# Patient Record
Sex: MALE | Race: ASIAN | NOT HISPANIC OR LATINO | URBAN - METROPOLITAN AREA
[De-identification: names, ages, dates, MRNs, and addresses within clinical notes are randomized per-mention and may not be internally consistent; named-entity substitution may affect disease eponyms.]

---

## 2020-01-05 ENCOUNTER — EMERGENCY (EMERGENCY)
Age: 20
LOS: 1 days | Discharge: HOME | End: 2020-01-05
Attending: EMERGENCY MEDICINE | Admitting: EMERGENCY MEDICINE
Payer: COMMERCIAL

## 2020-01-05 VITALS
DIASTOLIC BLOOD PRESSURE: 81 MMHG | HEART RATE: 90 BPM | TEMPERATURE: 98 F | OXYGEN SATURATION: 100 % | WEIGHT: 198.2 LBS | RESPIRATION RATE: 18 BRPM | SYSTOLIC BLOOD PRESSURE: 135 MMHG

## 2020-01-05 PROCEDURE — 29125 APPL SHORT ARM SPLINT STATIC: CPT | Mod: LT

## 2020-01-05 PROCEDURE — 99284 EMERGENCY DEPT VISIT MOD MDM: CPT | Mod: 25

## 2020-01-05 NOTE — ED STATDOCS - OBJECTIVE STATEMENT
I performed a medical screening examination and determined this patient to be medically stable and will transfer to the [facility] adult ED for further care. heart and lung exam done and both did not reveal concerns for immediate intervention.   neurovascular intact of left wrist, deformity of wrist distal radius with swelling.  in sling.  called to adult side.

## 2020-01-05 NOTE — ED ADULT TRIAGE NOTE - CHIEF COMPLAINT QUOTE
Ptm fell on rt wrist snowboarding. Neurovascularly intact. + deformity noted.  No PMH IUTD NKA Ptm fell on rt wrist snowboarding. Neurovascularly intact. + deformity noted.  No PMH IUTD NKA      2334- Pt arrives from peds ED, c/o left wrist pain/deformity after fall while snowboarding today at 6pm. Arrives with sling in place.

## 2020-01-06 PROCEDURE — 73130 X-RAY EXAM OF HAND: CPT | Mod: 26,LT

## 2020-01-06 PROCEDURE — 73080 X-RAY EXAM OF ELBOW: CPT | Mod: 26,LT

## 2020-01-06 PROCEDURE — 73090 X-RAY EXAM OF FOREARM: CPT | Mod: 26,LT

## 2020-01-06 PROCEDURE — 73110 X-RAY EXAM OF WRIST: CPT | Mod: 26,LT

## 2020-01-06 NOTE — ED PROVIDER NOTE - OBJECTIVE STATEMENT
19M no hx now p/w mild traumatic LUE throbbing pain from FOOSH while snowboarding.  Pain worse w/movement of L hand. No head trauma or LOC.  Pt is able to ROM shoulder & elbow & fingers w/o difficulty but moving his wrist (which he is able to do) hurts. No other complaints.

## 2020-01-06 NOTE — ED PROVIDER NOTE - PHYSICAL EXAMINATION
VS: afebrile, others reassuring   Gen: Well appearing in NAD  Head: NC/AT  HEENT: moist mucous membranes   Neck: trachea midline  Resp:  No distress  Ext: L upper extremity exam: + soft tissue swelling, deformity & ttp over distal forearm/wrist thus limiting ROM & strength testing although there is no erin focal deficit.  5/5 sensation in all districutions. 2+ distal pulses.  Brisk cap refill.  Skin intact.  No snuff box tenderness or pain w/axial load on thumb.   Neuro: A&Ox3, spont. interactive. CN2-12 intact. GCS 15. Strength 5/5 b/l UEs & LEs. No gross sensory deficits. Finger to nose intact b/l. gait normal/stable/unassisted.   Skin:  Warm and dry as visualized  Psych:  appropriate affect and mood

## 2020-01-06 NOTE — ED PROVIDER NOTE - PATIENT PORTAL LINK FT
You can access the FollowMyHealth Patient Portal offered by [facility] by registering at the following website: [email or web address]. By joining [employer]’s FollowMyHealth portal, you will also be able to view your health information using other applications (apps) compatible with our system.

## 2020-01-06 NOTE — ED PROVIDER NOTE - CARE PROVIDER_API CALL
Margaret Mcelroy)  Orthopedics  [address] [place] [address], [address]  [place] [address]  Phone: [phone]  Follow Up Time: 4-6 Days

## 2020-01-06 NOTE — ED PROVIDER NOTE - PROGRESS NOTE DETAILS
Zvi Dubin, MD note: I appreciated an extraarticular distal rad rx.  Sugar tong splint applied, pt given f/u, return to the Emergency Department instructions & is ready for dc.

## 2020-01-06 NOTE — ED PROVIDER NOTE - NSFOLLOWUPINSTRUCTIONS_ED_ALL_ED_FT
-- Please use 650mg Tylenol (also called acetaminophen) every 4 hours & 600mg Motrin (also called Advil or ibuprofen) every 6 hours as needed for pain/discomfort/swelling. You can get these without a prescription. Don't use more than 3000mg of Tylenol in any 24-hour period. Make sure your other prescription/over-the-counter medications don't contain any Tylenol so you don't take too much. If you have any stomach discomfort while taking Motrin, you can use TUMS or Pepcid or Zantac (these can also be bought without a prescription).   -- Please follow up with a n orthopedist.  Please call for an appointment as soon as possible.  Explain why you were in the Emergency Department and that you need a follow up visit for continued outpatient care as soon as possible.  We will give you a list of these doctors but make sure your health insurance covers the doctor before making an appointment.   -- Rest, ice, elevate area.  Apply ice to the area for 10 minutes every 2 hours for the first 2 days after the injury to reduce swelling.  -- If you have any worsening of symptoms, including severe pain/swelling/redness/numbness/changes in sensation/weakness/paralysis or any other concerns please return to the Emergency Department immediately.  -- You were given a copy of the results from any tests performed today in the Emergency Department which have results available.  Show these to your doctor(s).   Some of the tests we sent may not have results yet so please call or have your doctor call the Emergency Department to follow up on all results.  -- Please continue taking your home medications as directed.  Do not use alcohol when taking any medication (especially antibiotics, tylenol or other pain medication) unless you check with the doctor or pharmacist.

## 2020-01-06 NOTE — ED PROVIDER NOTE - CLINICAL SUMMARY MEDICAL DECISION MAKING FREE TEXT BOX
YAMILA BANERJEE w/ exam most c/w distal rad fx.  Pt declining analgesia at this time.  will XR hand/wrist/forearm/elbow. YAMILA BANERJEE w/ exam most c/w distal rad fx.  neurovascularly intact. Pt declining analgesia at this time.  will XR hand/wrist/forearm/elbow.

## 2020-01-06 NOTE — ED POST DISCHARGE NOTE - RESULT SUMMARY
Dr Beck from Radiology called LT Wrist Xray : Suspected minimally impacted intra articular distal radial FX suggested on the LT forearm, Patient put in a sugar tong splint.  Patient only speak chinese. Patient contact # [phone] called [facility] [employer]. ID # [id number]. Discussed with patient and patient's mother possible impacted intra articular distal FX and that patient should follow up with Orthopedist as soon as possible. Return precautions given to patient and patient's mother.

## 2020-01-06 NOTE — ED PROVIDER NOTE - CARE PLAN
Principal Discharge DX:	Other closed extra-articular fracture of distal end of left radius, initial encounter

## 2020-01-07 ENCOUNTER — APPOINTMENT (OUTPATIENT)
Age: 20
End: 2020-01-07
Payer: COMMERCIAL

## 2020-01-07 VITALS
HEIGHT: 72 IN | HEART RATE: 58 BPM | WEIGHT: 190 LBS | SYSTOLIC BLOOD PRESSURE: 111 MMHG | BODY MASS INDEX: 25.73 KG/M2 | DIASTOLIC BLOOD PRESSURE: 56 MMHG

## 2020-01-07 DIAGNOSIS — S52.552A OTHER EXTRAARTICULAR FRACTURE OF LOWER END OF LEFT RADIUS, INITIAL ENCOUNTER FOR CLOSED FRACTURE: ICD-10-CM

## 2020-01-07 PROBLEM — Z78.9 OTHER SPECIFIED HEALTH STATUS: Chronic | Status: ACTIVE | Noted: 2020-01-06

## 2020-01-07 PROBLEM — Z00.00 ENCOUNTER FOR PREVENTIVE HEALTH EXAMINATION: Status: ACTIVE | Noted: 2020-01-07

## 2020-01-07 PROCEDURE — 29085 APPL CAST HAND&LWR FOREARM: CPT | Mod: LT

## 2020-01-07 PROCEDURE — 73110 X-RAY EXAM OF WRIST: CPT | Mod: LT

## 2020-01-07 PROCEDURE — 99203 OFFICE O/P NEW LOW 30 MIN: CPT | Mod: 25

## 2020-01-08 NOTE — PROCEDURE
[de-identified] : Short arm cast applied. Post cast xrays WNL. NVI post. Tolerated procedure well. Cast precautions explained.

## 2020-01-08 NOTE — DATA REVIEWED
[de-identified] : L wrist xrays (1/7/20): pre post casting. There is a nondisplaced radial styloid fx, with lateral cortical buckeling. No SL widening. No scaphoid fx is seen. Post cast xrays WNL.

## 2020-01-08 NOTE — DISCUSSION/SUMMARY
[de-identified] : This is a 19M w/ nondisplaced left DR Fx. He should be NWB in SAC/sling. Cast precautions discussed. Continue elevation, icing, NSAIDs PRN. FU in 3 weeks for rpt xrays and conversion to removable wrist splint. Natural history of this condition was discussed in detail. Possibility of occult fx's also discussed, which may be noticeable on future xrays. This plan was discussed in detail with the patient who was in full agreement. All questions were answered.

## 2020-01-08 NOTE — PHYSICAL EXAM
[FreeTextEntry1] : General: well nourished, in no acute distress, alert and oriented to person, place and time.\par Psychiatric: normal mood and affect, no abnormal movements or speech patterns.\par Eyes: vision intact without deficits, sclera and conjunctiva were normal, pupils were equal in size. \par ENT: Ears and nose were normal in appearance. No thyromegaly.\par Lymph: no enlarged nodes, no lymphedema in extremity.\par Respiratory: Normal respiratory rhythm and effort. No wheezing detected without auscultation. No shortness of breath or respiratory distress.\par Cardiac: no cardiac related leg swelling, 2+ peripheral pulses.\par Neurology: normal gross sensation in extremities to light touch.\par Abdomen: soft, non-tender, tympanic, no masses.\par  \par LUE:\par Cast CDI, removed. Underlying skin CDI. Diffuse wrist swelling w/o ecchymosis. +TTP over dorsal distal radius. No elbow pain w/ ROM. +pain with attempted wrist ROM. No snuff box TTP. \par M/U/R/AIN/PIN 5/5. M/U/R/Ax SILT. +2 Rad pulse. Compartments soft. \par

## 2020-01-08 NOTE — HISTORY OF PRESENT ILLNESS
[FreeTextEntry1] : This is a RHD healthy 19M who is presenting with 2 days of acute L wrist pain which began after falling onto both of his outstretched wrists while snowboarding. Denies any other injuries or Right wrist pain. Xrays in the ED revealed a possible distal radius fx. He was placed in an orthoglass sugartong splint by the ED. Today he is here for further care. Currently pain is 8/10 with attempted wrist motion. 0/10 at rest. Has not taken any pain medication.

## 2020-01-31 ENCOUNTER — APPOINTMENT (OUTPATIENT)
Dept: ORTHOPEDIC SURGERY | Facility: CLINIC | Age: 20
End: 2020-01-31
